# Patient Record
Sex: MALE | Race: WHITE | NOT HISPANIC OR LATINO | Employment: UNEMPLOYED | URBAN - METROPOLITAN AREA
[De-identification: names, ages, dates, MRNs, and addresses within clinical notes are randomized per-mention and may not be internally consistent; named-entity substitution may affect disease eponyms.]

---

## 2020-04-06 ENCOUNTER — HOSPITAL ENCOUNTER (EMERGENCY)
Facility: HOSPITAL | Age: 55
Discharge: HOME | End: 2020-04-06
Attending: EMERGENCY MEDICINE
Payer: COMMERCIAL

## 2020-04-06 VITALS
HEART RATE: 74 BPM | WEIGHT: 300 LBS | DIASTOLIC BLOOD PRESSURE: 98 MMHG | OXYGEN SATURATION: 99 % | BODY MASS INDEX: 40.63 KG/M2 | SYSTOLIC BLOOD PRESSURE: 170 MMHG | RESPIRATION RATE: 20 BRPM | TEMPERATURE: 98 F | HEIGHT: 72 IN

## 2020-04-06 DIAGNOSIS — I10 HYPERTENSION, UNSPECIFIED TYPE: Primary | ICD-10-CM

## 2020-04-06 DIAGNOSIS — Z20.822 CLOSE EXPOSURE TO COVID-19 VIRUS: ICD-10-CM

## 2020-04-06 PROCEDURE — 99281 EMR DPT VST MAYX REQ PHY/QHP: CPT

## 2020-04-06 NOTE — ED NOTES
Patient identifiers for Florian Orozco checked and correct.    LOC: The patient is awake, alert and aware of environment with an appropriate affect, the patient is oriented x 3 and speaking appropriately.  APPEARANCE: Patient resting comfortably and in no acute distress, patient is clean and well groomed, patient's clothing are properly fastened.  SKIN: The skin is warm and dry, patient has age appropriate skin turgor and moist mucus membranes, skin intact, no breakdown or bruising noted.  MUSCULOSKELETAL: Patient moving all extremities well, no obvious swelling or deformities noted.  RESPIRATORY: Airway is open and patent, respirations are spontaneous, patient has a normal effort and rate, no accessory muscle use noted.  Clear breath sounds bilaterally.  CARDIAC: Patient has a normal rate and rhythm, no periphreal edema noted, capillary refill < 3 seconds.  ABDOMEN: Soft and non tender to palpation, no distention noted.  Normoactive bowel sounds x4.  NEUROLOGIC: PERRL, facial expression is symmetrical, bilateral hand grasp equal and even, normal sensation in all extremities when touched with a finger.

## 2020-04-06 NOTE — ED NOTES
"Pt presents to the ED from home stating "I'm only here because my sister wanted me to get tested for the coronavirus but I feel fine. The only thing is sometimes my body feels like it's on fire, but I feel fine." Pt denies any cp, sob, cough, fever, n/v/d. Pt noted to be HTN in triage, denies taking anything for BP, denies visual changes, HA, dizziness or weakness. Pt ambulatory without difficulty. Pt appears in NAD.   "

## 2020-04-06 NOTE — ED PROVIDER NOTES
"Encounter Date: 4/6/2020       History     Chief Complaint   Patient presents with    Hypertension     incidental finding in triage, pt denies taking BP meds, denies HA, cp, visual changes, dizziness, weakness     Generalized Body Aches     pt states "my sister wanted to me to come get tested for the virus because she has it and I feel fine, except my body feels like it's on fire"; pt denies any sob, cough, fever, n/v/d     54-year-old male with presents the emergency room to get tested for COVID.  Patient states that his sister wanted him to come get tested after she was tested positive.  The patient denies any symptoms at this time.    The history is provided by the patient.     Review of patient's allergies indicates:  No Known Allergies  No past medical history on file.  No past surgical history on file.  No family history on file.  Social History     Tobacco Use    Smoking status: Not on file   Substance Use Topics    Alcohol use: Not on file    Drug use: Not on file     Review of Systems   Constitutional: Negative for fever.   HENT: Negative for sore throat.    Respiratory: Negative for shortness of breath.    Cardiovascular: Negative for chest pain.   Gastrointestinal: Negative for nausea.   Genitourinary: Negative for dysuria.   Musculoskeletal: Negative for back pain.   Skin: Negative for rash.   Neurological: Negative for weakness.   Hematological: Does not bruise/bleed easily.   All other systems reviewed and are negative.    Physical Exam     Initial Vitals [04/06/20 1536]   BP Pulse Resp Temp SpO2   (!) 186/110 74 20 98.2 °F (36.8 °C) 99 %      MAP       --         Physical Exam    Nursing note and vitals reviewed.  Constitutional: He appears well-developed and well-nourished.   Neurological: He is alert and oriented to person, place, and time. He has normal strength. GCS score is 15. GCS eye subscore is 4. GCS verbal subscore is 5. GCS motor subscore is 6.     The patient was seen virtually to " reduce the risk of COVID exposure. The patient did not have any signs of respiratory distress on video chat. The patient was able to speak in complete sentences without difficulty breathing. The patient was well appearing.     ED Course   Procedures  Labs Reviewed - No data to display        Medical Decision Making:   Initial Assessment:   54-year-old male with presents the emergency room to get tested for COVID.  Patient states that his sister wanted him to come get tested after she was tested positive.  The patient denies any symptoms at this time.    Differential Diagnosis:   Hypertension, exposure to COVID, normal exam  ED Management:  The patient was seen virtually to reduce the risk of COVID exposure. The patient did not have any signs of respiratory distress on video chat. The patient was able to speak in complete sentences without difficulty breathing. The patient was well appearing.  Patient does not have any qualifications at this time for testing.  Patient's blood pressure was slightly elevated upon arrival to the emergency room but he is asymptomatic.  Patient has been referred to [facility] family medicine for further workup of his blood pressure. Patient given strict return precautions and voiced understanding of all discharge instructions. Pt was stable at discharge.                          ED Course as of Apr 06 1637   Mon Apr 06, 2020   1548 BP(!): 186/110 [AT]   1548 Temp: 98.2 °F (36.8 °C) [AT]   1548 Temp src: Oral [AT]   1548 Pulse: 74 [AT]   1548 Resp: 20 [AT]   1548 SpO2: 99 % [AT]      ED Course User Index  [AT] SCOTTY Bailey          Clinical Impression:       ICD-10-CM ICD-9-CM   1. Hypertension, unspecified type I10 401.9   2. Close Exposure to Covid-19 Virus Z20.828                                 SCOTTY Bailey  04/06/20 5516